# Patient Record
Sex: FEMALE | Race: WHITE | NOT HISPANIC OR LATINO | Employment: FULL TIME | URBAN - METROPOLITAN AREA
[De-identification: names, ages, dates, MRNs, and addresses within clinical notes are randomized per-mention and may not be internally consistent; named-entity substitution may affect disease eponyms.]

---

## 2020-04-07 ENCOUNTER — TELEPHONE (OUTPATIENT)
Dept: OBSTETRICS AND GYNECOLOGY | Facility: CLINIC | Age: 30
End: 2020-04-07

## 2020-04-07 DIAGNOSIS — N39.0 URINARY TRACT INFECTION WITHOUT HEMATURIA, SITE UNSPECIFIED: Primary | ICD-10-CM

## 2020-04-07 RX ORDER — NITROFURANTOIN 25; 75 MG/1; MG/1
100 CAPSULE ORAL 2 TIMES DAILY
Qty: 14 CAPSULE | Refills: 0 | Status: SHIPPED | OUTPATIENT
Start: 2020-04-07 | End: 2020-04-14

## 2020-04-07 NOTE — TELEPHONE ENCOUNTER
----- Message from Viral Barahona sent at 4/7/2020  2:02 PM CDT -----  Contact: pt  Type:  Patient Returning Call    Who Called:Kisha  Who Left Message for Patient:na  Does the patient know what this is regarding?:no  Would the patient rather a call back or a response via [facility]? Call back  Best Call Back Number:[phone] (home)   Additional Information: ilan

## 2020-04-07 NOTE — TELEPHONE ENCOUNTER
----- Message from Kristi Brandt sent at 4/7/2020  9:45 AM CDT -----  Contact: PT   Type:  Needs Medical Advice    Who Called: pt   Symptoms (please be specific): burning when urine    How long has patient had these symptoms:  1day   Pharmacy name and phone #:  [facility]   Would the patient rather a call back or a response via [facility]? Callback   Best Call Back Number: [phone]   Additional Information:    [facility] almaraz

## 2020-04-07 NOTE — TELEPHONE ENCOUNTER
Advised pt. To go to [facility] ([facility] to have UA prior to getting med. At pharmacy.  Pt. Verbalized understanding.

## 2020-04-16 LAB
AMORPH URATE CRY URNS QL MICRO: ABNORMAL
BACTERIA #/AREA URNS HPF: ABNORMAL /[HPF]
BILIRUB UR QL STRIP: ABNORMAL
CLARITY UR: CLEAR
COLOR UR: ABNORMAL
EPITHELIAL CELLS: ABNORMAL
GLUCOSE (UA): NEGATIVE MG/DL
KETONES UR QL STRIP: NEGATIVE MG/DL
LEUKOCYTE ESTERASE UR QL STRIP: ABNORMAL
MUCOUS THREADS URNS QL MICRO: ABNORMAL
NITRITE UR QL STRIP: POSITIVE
OCCULT BLOOD: ABNORMAL
PH, URINE: 7 (ref 5–7.5)
PROT UR QL STRIP: 15 MG/DL
RBC/HPF: ABNORMAL
SP GR UR STRIP: 1.01 (ref 1–1.03)
URINE CULTURE, ROUTINE: NORMAL
UROBILINOGEN, URINE: 8 E.U./DL (ref 0–1)
WBC/HPF: ABNORMAL

## 2020-06-11 ENCOUNTER — OFFICE VISIT (OUTPATIENT)
Dept: OBSTETRICS AND GYNECOLOGY | Facility: CLINIC | Age: 30
End: 2020-06-11
Payer: COMMERCIAL

## 2020-06-11 VITALS
HEIGHT: 67 IN | DIASTOLIC BLOOD PRESSURE: 80 MMHG | WEIGHT: 118 LBS | SYSTOLIC BLOOD PRESSURE: 122 MMHG | HEART RATE: 112 BPM | BODY MASS INDEX: 18.52 KG/M2

## 2020-06-11 DIAGNOSIS — Z01.419 WELL WOMAN EXAM: Primary | ICD-10-CM

## 2020-06-11 PROCEDURE — 99395 PREV VISIT EST AGE 18-39: CPT | Mod: S$GLB,,, | Performed by: OBSTETRICS & GYNECOLOGY

## 2020-06-11 PROCEDURE — 99395 PR PREVENTIVE VISIT,EST,18-39: ICD-10-PCS | Mod: S$GLB,,, | Performed by: OBSTETRICS & GYNECOLOGY

## 2020-06-11 RX ORDER — VENLAFAXINE HYDROCHLORIDE 37.5 MG/1
CAPSULE, EXTENDED RELEASE ORAL
COMMUNITY
Start: 2020-06-09 | End: 2022-10-06

## 2020-06-11 NOTE — PROGRESS NOTES
Subjective:       Patient ID: Kisha Gillis is a 30 y.o. female.    Chief Complaint:  Well Woman (LAST PAP 11/15/18 NEGATIVE, lmp: 2wks, no ocp )      History of Present Illness  She is doing well.  No new health issues,  No abnormal bleeding, No GI or Gu concerns,  No dyspareunia  Her only concern is decrease sex desire  Long discussion .   HPI      GYN & OB History  Patient's last menstrual period was 2020.     Date of Last Pap: No result found    OB History    Para Term  AB Living   2 2 2         SAB TAB Ectopic Multiple Live Births                  # Outcome Date GA Lbr Fabien/2nd Weight Sex Delivery Anes PTL Lv   2 Term 17 37w0d  3.459 kg (7 lb 10 oz) M Vag-Spont      1 Term 10/15/15 38w0d  3.062 kg (6 lb 12 oz) F Vag-Spont          Review of Systems  Review of Systems   Constitutional: Negative for activity change.   Eyes: Negative for visual disturbance.   Respiratory: Negative for shortness of breath.    Cardiovascular: Negative for chest pain.   Gastrointestinal: Negative for abdominal pain.   Genitourinary: Negative for vaginal bleeding.        No abnormal vaginal bleeding   Musculoskeletal: Negative for back pain.   Integumentary:  Negative for rash and breast mass.   Neurological: Negative for numbness.   Psychiatric/Behavioral:        No mood disturbance or changes    Breast: Negative for mass            Objective:    Physical Exam:   Constitutional: She is oriented to person, place, and time. She appears well-developed. She is cooperative.    HENT:   Head: Normocephalic.     Neck: Trachea normal. Neck supple. No thyromegaly present.    Cardiovascular: Regular rhythm and normal heart sounds.     Pulmonary/Chest: Effort normal and breath sounds normal. Right breast exhibits no mass, no nipple discharge and no skin change. Left breast exhibits no mass, no nipple discharge and no skin change.   Bilateral fibrocystic changes  During the exam self breast exams discussed          Abdominal: Soft. Normal appearance. There is no hepatosplenomegaly. There is no tenderness. There is no rebound and no guarding.     Genitourinary: Vagina normal and uterus normal. Pelvic exam was performed with patient supine. There is no lesion on the right labia. There is no lesion on the left labia. Uterus is not enlarged and not tender. Cervix is normal. Right adnexum displays no mass and no tenderness. Left adnexum displays no mass and no tenderness. No rectocele, cystocele or unspecified prolapse of vaginal walls in the vagina. Labial bartholins normal.Cervix exhibits no discharge. Additional cervical findings: pap smear done             Lymphadenopathy:        Head (right side): No submental and no submandibular adenopathy present.        Head (left side): No submental and no submandibular adenopathy present.     She has no cervical adenopathy.        Right: No inguinal adenopathy present.        Left: No inguinal adenopathy present.    Neurological: She is alert and oriented to person, place, and time.    Skin: Skin is warm and intact.    Psychiatric: She has a normal mood and affect. Her speech is normal and behavior is normal. Thought content normal.          Assessment:        1. Well woman exam    2. Sexual desire disorder                 Plan:             Pap discussed will return for her annual   Long discussion regarding sex desire   Pt is aware we call all results. If she does not hear from our office regarding her result within a week of having a study or procedure performed she is to call the office so that we can research the result for her.  Birth control discussed  Chaperone was present

## 2021-06-15 ENCOUNTER — OFFICE VISIT (OUTPATIENT)
Dept: OBSTETRICS AND GYNECOLOGY | Facility: CLINIC | Age: 31
End: 2021-06-15
Payer: COMMERCIAL

## 2021-06-15 VITALS
BODY MASS INDEX: 18.17 KG/M2 | DIASTOLIC BLOOD PRESSURE: 88 MMHG | HEART RATE: 94 BPM | SYSTOLIC BLOOD PRESSURE: 126 MMHG | WEIGHT: 116 LBS

## 2021-06-15 DIAGNOSIS — Z01.419 WELL WOMAN EXAM WITH ROUTINE GYNECOLOGICAL EXAM: Primary | ICD-10-CM

## 2021-06-15 PROCEDURE — 3008F BODY MASS INDEX DOCD: CPT | Mod: CPTII,S$GLB,, | Performed by: OBSTETRICS & GYNECOLOGY

## 2021-06-15 PROCEDURE — 99395 PREV VISIT EST AGE 18-39: CPT | Mod: S$GLB,,, | Performed by: OBSTETRICS & GYNECOLOGY

## 2021-06-15 PROCEDURE — 99395 PR PREVENTIVE VISIT,EST,18-39: ICD-10-PCS | Mod: S$GLB,,, | Performed by: OBSTETRICS & GYNECOLOGY

## 2021-06-15 PROCEDURE — 3008F PR BODY MASS INDEX (BMI) DOCUMENTED: ICD-10-PCS | Mod: CPTII,S$GLB,, | Performed by: OBSTETRICS & GYNECOLOGY

## 2021-06-15 RX ORDER — FLIBANSERIN 100 MG/1
100 TABLET, FILM COATED ORAL DAILY
Qty: 30 TABLET | Refills: 12 | Status: SHIPPED | OUTPATIENT
Start: 2021-06-15 | End: 2023-10-09

## 2021-06-15 RX ORDER — ESCITALOPRAM OXALATE 10 MG/1
TABLET ORAL
COMMUNITY
Start: 2021-05-31

## 2021-06-16 LAB
ABS NRBC COUNT: 0 X 10 3/UL (ref 0–0.01)
ABSOLUTE BASOPHIL: 0.04 X 10 3/UL (ref 0–0.22)
ABSOLUTE EOSINOPHIL: 0.13 X 10 3/UL (ref 0.04–0.54)
ABSOLUTE IMMATURE GRAN: 0.01 X 10 3/UL (ref 0–0.04)
ABSOLUTE LYMPHOCYTE: 2.07 X 10 3/UL (ref 0.86–4.75)
ABSOLUTE MONOCYTE: 0.7 X 10 3/UL (ref 0.22–1.08)
ALBUMIN SERPL-MCNC: 4.8 G/DL (ref 3.5–5.2)
ALBUMIN/GLOB SERPL ELPH: 1.5 {RATIO} (ref 1–2.7)
ALP ISOS SERPL LEV INH-CCNC: 50 U/L (ref 35–105)
ALT (SGPT): 10 U/L (ref 0–33)
AMORPH URATE CRY URNS QL MICRO: NEGATIVE
ANION GAP SERPL CALC-SCNC: 9 MMOL/L (ref 8–17)
AST SERPL-CCNC: 17 U/L (ref 0–32)
BACTERIA #/AREA URNS HPF: ABNORMAL /[HPF]
BASOPHILS NFR BLD: 0.5 % (ref 0.2–1.2)
BILIRUB UR QL STRIP: NEGATIVE
BILIRUBIN, TOTAL: 0.43 MG/DL (ref 0–1.2)
BUN/CREAT SERPL: 10.7 (ref 6–20)
CALCIUM SERPL-MCNC: 9.7 MG/DL (ref 8.6–10.2)
CARBON DIOXIDE, CO2: 26 MMOL/L (ref 22–29)
CHLORIDE: 103 MMOL/L (ref 98–107)
CHOLEST SERPL-MSCNC: 170 MG/DL (ref 100–200)
CLARITY UR: CLEAR
COLOR UR: YELLOW
CREAT SERPL-MCNC: 0.72 MG/DL (ref 0.5–0.9)
EOSINOPHIL NFR BLD: 1.7 % (ref 0.7–7)
EPITHELIAL CELLS: ABNORMAL
GFR ESTIMATION: 95.11
GLOBULIN: 3.3 G/DL (ref 1.5–4.5)
GLUCOSE (UA): NEGATIVE MG/DL
GLUCOSE: 87 MG/DL (ref 74–106)
HCT VFR BLD AUTO: 41.1 % (ref 37–47)
HDLC SERPL-MCNC: 88 MG/DL
HGB BLD-MCNC: 13.1 G/DL (ref 12–16)
IMMATURE GRANULOCYTES: 0.1 % (ref 0–0.5)
KETONES UR QL STRIP: NEGATIVE MG/DL
LDL/HDL RATIO: 0.8 (ref 1–3)
LDLC SERPL CALC-MCNC: 73.4 MG/DL (ref 0–100)
LEUKOCYTE ESTERASE UR QL STRIP: ABNORMAL
LYMPHOCYTES NFR BLD: 27.4 % (ref 19.3–53.1)
MCH RBC QN AUTO: 29.2 PG (ref 27–32)
MCHC RBC AUTO-ENTMCNC: 31.9 G/DL (ref 32–36)
MCV RBC AUTO: 91.5 FL (ref 82–100)
MONOCYTES NFR BLD: 9.3 % (ref 4.7–12.5)
MUCOUS THREADS URNS QL MICRO: ABNORMAL
NEUTROPHILS # BLD AUTO: 4.6 X 10 3/UL (ref 2.15–7.56)
NEUTROPHILS NFR BLD: 61 %
NITRITE UR QL STRIP: NEGATIVE
NUCLEATED RED BLOOD CELLS: 0 /100 WBC (ref 0–0.2)
OCCULT BLOOD: NEGATIVE
PH, URINE: 7 (ref 5–7.5)
PLATELET # BLD AUTO: 268 X 10 3/UL (ref 135–400)
POTASSIUM: 4.3 MMOL/L (ref 3.5–5.1)
PROT SNV-MCNC: 8.1 G/DL (ref 6.4–8.3)
PROT UR QL STRIP: NEGATIVE MG/DL
RBC # BLD AUTO: 4.49 X 10 6/UL (ref 4.2–5.4)
RBC/HPF: NEGATIVE
RDW-SD: 45.8 FL (ref 37–54)
SODIUM: 138 MMOL/L (ref 136–145)
SP GR UR STRIP: 1.01 (ref 1–1.03)
T4, FREE: 1.17 NG/DL (ref 0.93–1.7)
TRIGL SERPL-MCNC: 43 MG/DL (ref 0–150)
TSH SERPL DL<=0.005 MIU/L-ACNC: 0.74 UIU/ML (ref 0.27–4.2)
UREA NITROGEN (BUN): 7.7 MG/DL (ref 6–20)
UROBILINOGEN, URINE: NORMAL E.U./DL (ref 0–1)
VITAMIN D (25OHD): 27.8 NG/ML
WBC # BLD: 7.55 X 10 3/UL (ref 4.3–10.8)
WBC/HPF: ABNORMAL

## 2021-07-02 ENCOUNTER — PATIENT MESSAGE (OUTPATIENT)
Dept: OBSTETRICS AND GYNECOLOGY | Facility: CLINIC | Age: 31
End: 2021-07-02

## 2021-07-02 DIAGNOSIS — N39.0 URINARY TRACT INFECTION WITHOUT HEMATURIA, SITE UNSPECIFIED: ICD-10-CM

## 2021-07-06 RX ORDER — METHENAMINE, SODIUM PHOSPHATE, MONOBASIC, MONOHYDRATE, PHENYL SALICYLATE, METHYLENE BLUE, AND HYOSCYAMINE SULFATE 118; 40.8; 36; 10; .12 MG/1; MG/1; MG/1; MG/1; MG/1
1 CAPSULE ORAL EVERY 6 HOURS PRN
Qty: 28 CAPSULE | Refills: 1 | Status: SHIPPED | OUTPATIENT
Start: 2021-07-06 | End: 2022-01-10 | Stop reason: SDUPTHER

## 2021-07-06 RX ORDER — NITROFURANTOIN 25; 75 MG/1; MG/1
100 CAPSULE ORAL 2 TIMES DAILY
Qty: 14 CAPSULE | Refills: 0 | Status: SHIPPED | OUTPATIENT
Start: 2021-07-06 | End: 2021-07-13

## 2021-07-29 ENCOUNTER — PATIENT MESSAGE (OUTPATIENT)
Dept: OBSTETRICS AND GYNECOLOGY | Facility: CLINIC | Age: 31
End: 2021-07-29

## 2021-07-29 DIAGNOSIS — R30.0 DYSURIA: Primary | ICD-10-CM

## 2021-07-31 LAB
AMORPH URATE CRY URNS QL MICRO: NEGATIVE
BACTERIA #/AREA URNS HPF: ABNORMAL /[HPF]
BILIRUB UR QL STRIP: NEGATIVE
CLARITY UR: CLEAR
COLOR UR: YELLOW
EPITHELIAL CELLS: ABNORMAL
GLUCOSE (UA): NEGATIVE MG/DL
KETONES UR QL STRIP: NEGATIVE MG/DL
LEUKOCYTE ESTERASE UR QL STRIP: ABNORMAL
MUCOUS THREADS URNS QL MICRO: NEGATIVE
NITRITE UR QL STRIP: NEGATIVE
OCCULT BLOOD: ABNORMAL
PH, URINE: 7 (ref 5–7.5)
PROT UR QL STRIP: NEGATIVE MG/DL
RBC/HPF: ABNORMAL
SP GR UR STRIP: 1 (ref 1–1.03)
URINE CULTURE, ROUTINE: NORMAL
UROBILINOGEN, URINE: NORMAL E.U./DL (ref 0–1)
WBC/HPF: ABNORMAL

## 2021-07-31 RX ORDER — NITROFURANTOIN 25; 75 MG/1; MG/1
100 CAPSULE ORAL 2 TIMES DAILY
Qty: 14 CAPSULE | Refills: 0 | Status: SHIPPED | OUTPATIENT
Start: 2021-07-31 | End: 2021-08-07

## 2022-01-10 ENCOUNTER — PATIENT MESSAGE (OUTPATIENT)
Dept: OBSTETRICS AND GYNECOLOGY | Facility: CLINIC | Age: 32
End: 2022-01-10
Payer: COMMERCIAL

## 2022-01-10 DIAGNOSIS — R30.0 DYSURIA: Primary | ICD-10-CM

## 2022-01-10 DIAGNOSIS — N39.0 URINARY TRACT INFECTION WITHOUT HEMATURIA, SITE UNSPECIFIED: ICD-10-CM

## 2022-01-10 RX ORDER — NITROFURANTOIN 25; 75 MG/1; MG/1
100 CAPSULE ORAL 2 TIMES DAILY
Qty: 14 CAPSULE | Refills: 0 | Status: SHIPPED | OUTPATIENT
Start: 2022-01-10 | End: 2022-01-17

## 2022-01-10 RX ORDER — METHENAMINE, SODIUM PHOSPHATE, MONOBASIC, MONOHYDRATE, PHENYL SALICYLATE, METHYLENE BLUE, AND HYOSCYAMINE SULFATE 118; 40.8; 36; 10; .12 MG/1; MG/1; MG/1; MG/1; MG/1
1 CAPSULE ORAL EVERY 6 HOURS PRN
Qty: 28 CAPSULE | Refills: 1 | Status: SHIPPED | OUTPATIENT
Start: 2022-01-10 | End: 2023-10-09

## 2022-01-10 NOTE — TELEPHONE ENCOUNTER
Good morning Kisha,     I will submit a medication request for these medications  to Dr Anna for approval  Please check with your Pharmacy after 5:00    Karina   ===View-only below this line===      ----- Message -----       From:Kisha Gillis       Sent:1/10/2022  8:54 AM CST         To:Andi Anna III, MD    Subject:UTI    Friday night I woke up with symptoms of a UTI so Saturday morning I started taking some extra antibiotics that my mom had from DR. Anna. Im still taking them but wanted to see if yall could send out some more so I can continue taking them. The Antibiotics are nitrofurantoin mono 100mg. I know Im not supposed to start an antibiotic like that but I didnt want to suffer all weekend. Dr. Anna gave my mom extra antibiotics and the prescription for the pain (I guess a strong azo) I was wondering if he can give me the same thing for the next time I get one. I get UTIs  so often, I guess its genetics. Thanks!

## 2022-02-07 DIAGNOSIS — R30.0 DYSURIA: Primary | ICD-10-CM

## 2022-02-07 RX ORDER — CEPHALEXIN 500 MG/1
500 CAPSULE ORAL 3 TIMES DAILY
Qty: 21 CAPSULE | Refills: 0 | Status: SHIPPED | OUTPATIENT
Start: 2022-02-07 | End: 2022-02-14

## 2022-02-07 NOTE — TELEPHONE ENCOUNTER
Kisha,    I am ordering a Urinalysis and Culture and Sensitivity at [facility]. Please go and give a urine sample as soon as you are able. I am also sending Dr Anna a medication request.  Please check with your pharmacy after 5:00.  We will notify you of your results as soon as they become available.    Karina   ===View-only below this line===      ----- Message -----       From:Kisha Gillis       Sent:2/6/2022 10:40 AM CST         To:Andi Anna III, MD    Subject:UTI    I feel like I have another UTI. Ive been taking AZO defense since Friday. Just let me know what I need to do. I dont understand why I keep getting them. Thanks!

## 2022-02-09 LAB
AMORPH URATE CRY URNS QL MICRO: NEGATIVE
BACTERIA #/AREA URNS HPF: ABNORMAL /[HPF]
BILIRUB UR QL STRIP: NEGATIVE
CLARITY UR: CLEAR
COLOR UR: YELLOW
EPITHELIAL CELLS: ABNORMAL
GLUCOSE (UA): NEGATIVE MG/DL
KETONES UR QL STRIP: NEGATIVE MG/DL
LEUKOCYTE ESTERASE UR QL STRIP: ABNORMAL
MUCOUS THREADS URNS QL MICRO: NEGATIVE
NITRITE UR QL STRIP: NEGATIVE
OCCULT BLOOD: NEGATIVE
PH, URINE: 6 (ref 5–7.5)
PROT UR QL STRIP: NEGATIVE MG/DL
RBC/HPF: NEGATIVE
SP GR UR STRIP: 1 (ref 1–1.03)
URINE CULTURE, ROUTINE: NORMAL
UROBILINOGEN, URINE: NORMAL E.U./DL (ref 0–1)
WBC/HPF: ABNORMAL
YEAST URNS QL MICRO: ABNORMAL

## 2022-03-14 ENCOUNTER — PATIENT MESSAGE (OUTPATIENT)
Dept: OBSTETRICS AND GYNECOLOGY | Facility: CLINIC | Age: 32
End: 2022-03-14
Payer: COMMERCIAL

## 2022-03-14 ENCOUNTER — TELEPHONE (OUTPATIENT)
Dept: OBSTETRICS AND GYNECOLOGY | Facility: CLINIC | Age: 32
End: 2022-03-14
Payer: COMMERCIAL

## 2022-03-14 DIAGNOSIS — N39.0 URINARY TRACT INFECTION WITHOUT HEMATURIA, SITE UNSPECIFIED: Primary | ICD-10-CM

## 2022-03-14 LAB
AMORPH URATE CRY URNS QL MICRO: NEGATIVE
BACTERIA #/AREA URNS HPF: ABNORMAL /[HPF]
BILIRUB UR QL STRIP: NEGATIVE
CLARITY UR: CLEAR
COLOR UR: YELLOW
EPITHELIAL CELLS: ABNORMAL
GLUCOSE (UA): NEGATIVE MG/DL
KETONES UR QL STRIP: NEGATIVE MG/DL
LEUKOCYTE ESTERASE UR QL STRIP: ABNORMAL
MUCOUS THREADS URNS QL MICRO: NEGATIVE
NITRITE UR QL STRIP: NEGATIVE
OCCULT BLOOD: NEGATIVE
PH, URINE: 6.5 (ref 5–7.5)
PROT UR QL STRIP: NEGATIVE MG/DL
RBC/HPF: NEGATIVE
SP GR UR STRIP: 1 (ref 1–1.03)
UROBILINOGEN, URINE: NORMAL E.U./DL (ref 0–1)
WBC/HPF: ABNORMAL

## 2022-03-14 NOTE — TELEPHONE ENCOUNTER
Spoke to patient, she c/o burning with urination and abdominal discomfort. She is waiting at the path lab to do a urinalysis and is requesting that we send orders.   Orders printed and faxed. Patient advised that we will call her with results once they are available to us and send out the appropriate medication. Patient verbalized understanding.

## 2022-03-14 NOTE — TELEPHONE ENCOUNTER
----- Message from Olga Sierra sent at 3/14/2022 10:38 AM CDT -----  Contact: Meli @ [facility]  Requesting a call back regarding orders. Please fax to [phone], or call back at [phone]

## 2022-03-15 DIAGNOSIS — N39.0 URINARY TRACT INFECTION WITHOUT HEMATURIA, SITE UNSPECIFIED: Primary | ICD-10-CM

## 2022-03-15 DIAGNOSIS — N39.0 FREQUENT UTI: Primary | ICD-10-CM

## 2022-03-15 RX ORDER — NITROFURANTOIN 25; 75 MG/1; MG/1
100 CAPSULE ORAL DAILY PRN
Qty: 28 CAPSULE | Refills: 1 | Status: SHIPPED | OUTPATIENT
Start: 2022-03-15 | End: 2022-04-12

## 2022-03-15 RX ORDER — NITROFURANTOIN 25; 75 MG/1; MG/1
100 CAPSULE ORAL 2 TIMES DAILY
Qty: 14 CAPSULE | Refills: 0 | Status: SHIPPED | OUTPATIENT
Start: 2022-03-15 | End: 2022-03-22

## 2022-10-06 ENCOUNTER — OFFICE VISIT (OUTPATIENT)
Dept: OBSTETRICS AND GYNECOLOGY | Facility: CLINIC | Age: 32
End: 2022-10-06
Payer: COMMERCIAL

## 2022-10-06 VITALS
WEIGHT: 119 LBS | BODY MASS INDEX: 18.64 KG/M2 | DIASTOLIC BLOOD PRESSURE: 93 MMHG | HEART RATE: 105 BPM | SYSTOLIC BLOOD PRESSURE: 132 MMHG

## 2022-10-06 DIAGNOSIS — N39.0 URINARY TRACT INFECTION WITHOUT HEMATURIA, SITE UNSPECIFIED: ICD-10-CM

## 2022-10-06 DIAGNOSIS — Z01.419 WELL WOMAN EXAM WITH ROUTINE GYNECOLOGICAL EXAM: Primary | ICD-10-CM

## 2022-10-06 DIAGNOSIS — F52.0 HYPOACTIVE SEXUAL DESIRE DISORDER: ICD-10-CM

## 2022-10-06 PROCEDURE — 3008F BODY MASS INDEX DOCD: CPT | Mod: CPTII,S$GLB,, | Performed by: OBSTETRICS & GYNECOLOGY

## 2022-10-06 PROCEDURE — 3080F PR MOST RECENT DIASTOLIC BLOOD PRESSURE >= 90 MM HG: ICD-10-PCS | Mod: CPTII,S$GLB,, | Performed by: OBSTETRICS & GYNECOLOGY

## 2022-10-06 PROCEDURE — 3080F DIAST BP >= 90 MM HG: CPT | Mod: CPTII,S$GLB,, | Performed by: OBSTETRICS & GYNECOLOGY

## 2022-10-06 PROCEDURE — 3075F PR MOST RECENT SYSTOLIC BLOOD PRESS GE 130-139MM HG: ICD-10-PCS | Mod: CPTII,S$GLB,, | Performed by: OBSTETRICS & GYNECOLOGY

## 2022-10-06 PROCEDURE — 1159F MED LIST DOCD IN RCRD: CPT | Mod: CPTII,S$GLB,, | Performed by: OBSTETRICS & GYNECOLOGY

## 2022-10-06 PROCEDURE — 1159F PR MEDICATION LIST DOCUMENTED IN MEDICAL RECORD: ICD-10-PCS | Mod: CPTII,S$GLB,, | Performed by: OBSTETRICS & GYNECOLOGY

## 2022-10-06 PROCEDURE — 3075F SYST BP GE 130 - 139MM HG: CPT | Mod: CPTII,S$GLB,, | Performed by: OBSTETRICS & GYNECOLOGY

## 2022-10-06 PROCEDURE — 99395 PREV VISIT EST AGE 18-39: CPT | Mod: S$GLB,,, | Performed by: OBSTETRICS & GYNECOLOGY

## 2022-10-06 PROCEDURE — 3008F PR BODY MASS INDEX (BMI) DOCUMENTED: ICD-10-PCS | Mod: CPTII,S$GLB,, | Performed by: OBSTETRICS & GYNECOLOGY

## 2022-10-06 PROCEDURE — 99395 PR PREVENTIVE VISIT,EST,18-39: ICD-10-PCS | Mod: S$GLB,,, | Performed by: OBSTETRICS & GYNECOLOGY

## 2022-10-06 RX ORDER — NITROFURANTOIN 25; 75 MG/1; MG/1
CAPSULE ORAL
Qty: 30 CAPSULE | Refills: 3 | Status: SHIPPED | OUTPATIENT
Start: 2022-10-06 | End: 2023-10-09

## 2022-10-06 RX ORDER — BUSPIRONE HYDROCHLORIDE 5 MG/1
TABLET ORAL
COMMUNITY
Start: 2022-08-12 | End: 2023-10-09

## 2022-10-06 RX ORDER — NITROFURANTOIN 25; 75 MG/1; MG/1
100 CAPSULE ORAL 2 TIMES DAILY
Qty: 14 CAPSULE | Refills: 0 | Status: SHIPPED | OUTPATIENT
Start: 2022-10-06 | End: 2022-10-13

## 2022-10-06 RX ORDER — DEXTROAMPHETAMINE SACCHARATE, AMPHETAMINE ASPARTATE, DEXTROAMPHETAMINE SULFATE AND AMPHETAMINE SULFATE 2.5; 2.5; 2.5; 2.5 MG/1; MG/1; MG/1; MG/1
1 TABLET ORAL 2 TIMES DAILY
COMMUNITY
Start: 2022-08-15

## 2022-10-06 NOTE — PROGRESS NOTES
Subjective:       Patient ID: Kisha Gillis is a 32 y.o. female.    Chief Complaint:  Well Woman (PATIENT C/O RECURRENT UTI. SHE WOULD LIKE TO LEAVE URINE SAMPLE TODAY TO SEND TO THE LAB. SHE STATES SHE WAS GIVEN ANTIBIOTICS TO TAKE AFTER INTERCOURSE AS SHE FEELS THIS IS MOSTLY WHEN SHE GETS THEM, BUT IS WORRIED ABOUT TAKING THEM TOO OFTEN. )      History of Present Illness  She is doing well.  No new health issues,  No abnormal bleeding, No GI or Gu concerns,  No dyspareunia  . She finds that she was taking the a macrobid      she   HPI      GYN & OB History  Patient's last menstrual period was 2022.     Date of Last Pap: No result found    OB History    Para Term  AB Living   2 2 2         SAB IAB Ectopic Multiple Live Births                  # Outcome Date GA Lbr Fabien/2nd Weight Sex Delivery Anes PTL Lv   2 Term 17 37w0d  3.459 kg (7 lb 10 oz) M Vag-Spont      1 Term 10/15/15 38w0d  3.062 kg (6 lb 12 oz) F Vag-Spont          Review of Systems  Review of Systems   Constitutional:  Negative for activity change.   Eyes:  Negative for visual disturbance.   Respiratory:  Negative for shortness of breath.    Cardiovascular:  Negative for chest pain.   Gastrointestinal:  Negative for abdominal pain.   Genitourinary:  Negative for vaginal bleeding.        No abnormal vaginal bleeding   Musculoskeletal:  Negative for back pain.   Integumentary:  Negative for rash and breast mass.   Neurological:  Negative for numbness.   Psychiatric/Behavioral:          No mood disturbance or changes    Breast: Negative for mass          Objective:    Physical Exam:   Constitutional: She is oriented to person, place, and time. She appears well-developed. She is cooperative.    HENT:   Head: Normocephalic.     Neck: Trachea normal. No thyromegaly present.    Cardiovascular:  Normal rate, regular rhythm and normal heart sounds.             Pulmonary/Chest: Effort normal and breath sounds normal. Right breast  exhibits no mass, no nipple discharge and no skin change. Left breast exhibits no mass, no nipple discharge and no skin change.        Abdominal: Soft. There is no abdominal tenderness. There is no rebound and no guarding.     Genitourinary:    Vagina and uterus normal.      Pelvic exam was performed with patient supine.   Labial bartholins normal.There is no lesion on the right labia. There is no lesion on the left labia. Cervix is normal. Right adnexum displays no mass and no tenderness. Left adnexum displays no mass and no tenderness. Cervix exhibits no discharge. Uterus is not enlarged and not tender.              Lymphadenopathy:        Head (right side): No submental and no submandibular adenopathy present.        Head (left side): No submental and no submandibular adenopathy present.     She has no cervical adenopathy.    Neurological: She is alert and oriented to person, place, and time.    Skin: Skin is warm.    Psychiatric: She has a normal mood and affect. Her speech is normal and behavior is normal. Thought content normal.        Assessment:        1. Well woman exam with routine gynecological exam    2. Urinary tract infection without hematuria, site unspecified    3. Hypoactive sexual desire disorder                 Plan:             Pap discussed will return for her annual     Pt is aware we call all results. If she does not hear from our office regarding her result within a week of having a study or procedure performed she is to call the office so that we can research the result for her.  Birth control discussed  Chaperone was present

## 2022-10-07 LAB — Lab: NORMAL

## 2022-10-07 NOTE — PROGRESS NOTES
Dr Wilfrido Beard sent out an antibiotic to treat a urinary tract infection.  Please check with your Pharmacy.

## 2022-10-08 LAB
AMORPH URATE CRY URNS QL MICRO: ABNORMAL
BACTERIA #/AREA URNS HPF: ABNORMAL /[HPF]
BILIRUB UR QL STRIP: NEGATIVE
CLARITY UR: CLEAR
COLOR UR: YELLOW
EPITHELIAL CELLS: ABNORMAL
GLUCOSE (UA): NEGATIVE MG/DL
KETONES UR QL STRIP: NEGATIVE MG/DL
LEUKOCYTE ESTERASE UR QL STRIP: ABNORMAL
MUCOUS THREADS URNS QL MICRO: NEGATIVE
NITRITE UR QL STRIP: NEGATIVE
OCCULT BLOOD: NEGATIVE
PH, URINE: 6 (ref 5–7.5)
PROT UR QL STRIP: NEGATIVE MG/DL
RBC/HPF: NEGATIVE
SP GR UR STRIP: 1 (ref 1–1.03)
URINE CULTURE, ROUTINE: NORMAL
UROBILINOGEN, URINE: NORMAL E.U./DL (ref 0–1)
WBC/HPF: ABNORMAL

## 2022-10-10 ENCOUNTER — PATIENT MESSAGE (OUTPATIENT)
Dept: OBSTETRICS AND GYNECOLOGY | Facility: CLINIC | Age: 32
End: 2022-10-10
Payer: COMMERCIAL

## 2022-10-19 ENCOUNTER — PATIENT MESSAGE (OUTPATIENT)
Dept: OBSTETRICS AND GYNECOLOGY | Facility: CLINIC | Age: 32
End: 2022-10-19
Payer: COMMERCIAL

## 2022-10-19 DIAGNOSIS — N39.0 URINARY TRACT INFECTION WITHOUT HEMATURIA, SITE UNSPECIFIED: Primary | ICD-10-CM

## 2022-10-19 RX ORDER — CIPROFLOXACIN 500 MG/1
500 TABLET ORAL 2 TIMES DAILY
Qty: 14 TABLET | Refills: 0 | Status: SHIPPED | OUTPATIENT
Start: 2022-10-19 | End: 2022-10-26

## 2023-10-09 ENCOUNTER — OFFICE VISIT (OUTPATIENT)
Dept: OBSTETRICS AND GYNECOLOGY | Facility: CLINIC | Age: 33
End: 2023-10-09
Payer: COMMERCIAL

## 2023-10-09 VITALS
BODY MASS INDEX: 20.05 KG/M2 | HEART RATE: 92 BPM | WEIGHT: 128 LBS | DIASTOLIC BLOOD PRESSURE: 89 MMHG | SYSTOLIC BLOOD PRESSURE: 147 MMHG

## 2023-10-09 DIAGNOSIS — Z01.419 WELL WOMAN EXAM WITH ROUTINE GYNECOLOGICAL EXAM: ICD-10-CM

## 2023-10-09 DIAGNOSIS — N94.6 DYSMENORRHEA: Primary | ICD-10-CM

## 2023-10-09 DIAGNOSIS — N92.0 MENORRHAGIA WITH REGULAR CYCLE: ICD-10-CM

## 2023-10-09 PROCEDURE — 99395 PREV VISIT EST AGE 18-39: CPT | Mod: S$GLB,,, | Performed by: OBSTETRICS & GYNECOLOGY

## 2023-10-09 PROCEDURE — 1159F PR MEDICATION LIST DOCUMENTED IN MEDICAL RECORD: ICD-10-PCS | Mod: CPTII,S$GLB,, | Performed by: OBSTETRICS & GYNECOLOGY

## 2023-10-09 PROCEDURE — 3008F BODY MASS INDEX DOCD: CPT | Mod: CPTII,S$GLB,, | Performed by: OBSTETRICS & GYNECOLOGY

## 2023-10-09 PROCEDURE — 3079F DIAST BP 80-89 MM HG: CPT | Mod: CPTII,S$GLB,, | Performed by: OBSTETRICS & GYNECOLOGY

## 2023-10-09 PROCEDURE — 3077F SYST BP >= 140 MM HG: CPT | Mod: CPTII,S$GLB,, | Performed by: OBSTETRICS & GYNECOLOGY

## 2023-10-09 PROCEDURE — 99395 PR PREVENTIVE VISIT,EST,18-39: ICD-10-PCS | Mod: S$GLB,,, | Performed by: OBSTETRICS & GYNECOLOGY

## 2023-10-09 PROCEDURE — 3079F PR MOST RECENT DIASTOLIC BLOOD PRESSURE 80-89 MM HG: ICD-10-PCS | Mod: CPTII,S$GLB,, | Performed by: OBSTETRICS & GYNECOLOGY

## 2023-10-09 PROCEDURE — 3077F PR MOST RECENT SYSTOLIC BLOOD PRESSURE >= 140 MM HG: ICD-10-PCS | Mod: CPTII,S$GLB,, | Performed by: OBSTETRICS & GYNECOLOGY

## 2023-10-09 PROCEDURE — 3008F PR BODY MASS INDEX (BMI) DOCUMENTED: ICD-10-PCS | Mod: CPTII,S$GLB,, | Performed by: OBSTETRICS & GYNECOLOGY

## 2023-10-09 PROCEDURE — 1159F MED LIST DOCD IN RCRD: CPT | Mod: CPTII,S$GLB,, | Performed by: OBSTETRICS & GYNECOLOGY

## 2023-10-09 NOTE — PROGRESS NOTES
Subjective:       Patient ID: Kisha Gillis is a 33 y.o. female.    Chief Complaint:  Well Woman      History of Present Illness  She is doing well.  No new health issues,  No abnormal bleeding, No GI or Gu concerns,  No dyspareunia  Reg cycles very heavy  vasectomy   sig cramps   the first two but she will cramp for two weeks before and she has moodiness.  She is on lexapro and buspar she does take adderal with her work.     .   HPI      GYN & OB History  Patient's last menstrual period was 2023.     Date of Last Pap: No result found    OB History    Para Term  AB Living   2 2 2         SAB IAB Ectopic Multiple Live Births                  # Outcome Date GA Lbr Fabien/2nd Weight Sex Delivery Anes PTL Lv   2 Term 17 37w0d  3.459 kg (7 lb 10 oz) M Vag-Spont      1 Term 10/15/15 38w0d  3.062 kg (6 lb 12 oz) F Vag-Spont          Review of Systems  Review of Systems   Constitutional:  Negative for activity change.   Eyes:  Negative for visual disturbance.   Respiratory:  Negative for shortness of breath.    Cardiovascular:  Negative for chest pain.   Gastrointestinal:  Negative for abdominal pain.   Genitourinary:  Positive for dysmenorrhea and menorrhagia. Negative for vaginal bleeding.        No abnormal vaginal bleeding   Musculoskeletal:  Negative for back pain.   Integumentary:  Negative for rash and breast mass.   Neurological:  Negative for numbness.   Psychiatric/Behavioral:          No mood disturbance or changes    Breast: Negative for mass            Objective:    Physical Exam:   Constitutional: She is oriented to person, place, and time. She appears well-developed. She is cooperative.    HENT:   Head: Normocephalic.     Neck: Trachea normal. No thyromegaly present.    Cardiovascular:  Normal rate, regular rhythm and normal heart sounds.             Pulmonary/Chest: Effort normal and breath sounds normal. Right breast exhibits no mass, no nipple discharge and no skin change. Left  breast exhibits no mass, no nipple discharge and no skin change.        Abdominal: Soft. There is no abdominal tenderness. There is no rebound and no guarding.     Genitourinary:    Vagina and uterus normal.      Pelvic exam was performed with patient supine.   Labial bartholins normal.There is no lesion on the right labia. There is no lesion on the left labia. Cervix is normal. Right adnexum displays no mass and no tenderness. Left adnexum displays no mass and no tenderness. Cervix exhibits no discharge. Uterus is not enlarged and not tender.              Lymphadenopathy:        Head (right side): No submental and no submandibular adenopathy present.        Head (left side): No submental and no submandibular adenopathy present.     She has no cervical adenopathy.    Neurological: She is alert and oriented to person, place, and time.    Skin: Skin is warm.    Psychiatric: She has a normal mood and affect. Her speech is normal and behavior is normal. Thought content normal.          Assessment:        1. Dysmenorrhea    2. Menorrhagia with regular cycle    3. Well woman exam with routine gynecological exam                 Plan:             Pap not done   discussed with her that she would do great with a [facility]   she will consider     labs offered  discussed will return for her annual     Pt is aware we call all results. If she does not hear from our office regarding her result within a week of having a study or procedure performed she is to call the office so that we can research the result for her.  Birth control discussed  Chaperone was present

## 2025-08-07 ENCOUNTER — OFFICE VISIT (OUTPATIENT)
Dept: OBSTETRICS AND GYNECOLOGY | Facility: CLINIC | Age: 35
End: 2025-08-07
Payer: COMMERCIAL

## 2025-08-07 VITALS
DIASTOLIC BLOOD PRESSURE: 82 MMHG | HEART RATE: 93 BPM | WEIGHT: 125.19 LBS | SYSTOLIC BLOOD PRESSURE: 118 MMHG | BODY MASS INDEX: 19.61 KG/M2

## 2025-08-07 DIAGNOSIS — N94.6 DYSMENORRHEA: ICD-10-CM

## 2025-08-07 DIAGNOSIS — Z01.419 WELL WOMAN EXAM WITH ROUTINE GYNECOLOGICAL EXAM: Primary | ICD-10-CM

## 2025-08-07 PROCEDURE — 3079F DIAST BP 80-89 MM HG: CPT | Mod: CPTII,,,

## 2025-08-07 PROCEDURE — 1159F MED LIST DOCD IN RCRD: CPT | Mod: CPTII,,,

## 2025-08-07 PROCEDURE — 3008F BODY MASS INDEX DOCD: CPT | Mod: CPTII,,,

## 2025-08-07 PROCEDURE — 3074F SYST BP LT 130 MM HG: CPT | Mod: CPTII,,,

## 2025-08-07 PROCEDURE — 99395 PREV VISIT EST AGE 18-39: CPT | Mod: S$PBB,,,

## 2025-08-07 RX ORDER — NAPROXEN 500 MG/1
500 TABLET ORAL EVERY 12 HOURS PRN
Qty: 30 TABLET | Refills: 3 | Status: SHIPPED | OUTPATIENT
Start: 2025-08-07

## 2025-08-07 RX ORDER — CLONAZEPAM 0.5 MG/1
0.5 TABLET ORAL 2 TIMES DAILY PRN
COMMUNITY

## 2025-08-07 NOTE — PROGRESS NOTES
Subjective:      Patient ID: Kisha Gillis is a 35 y.o. female who presents for evaluation today.    Chief Complaint:    Well Woman      History of Present Illness  HPI  Annual Exam (Premenopausal) - Patient presents for annual exam. The patient also has complaints of crampy periods. The patient is sexually active. GYN screening history: last pap: was normal. The patient wears seatbelts: yes. The patient participates in regular exercise: yes. Has the patient ever been transfused or tattooed?: not asked. The patient reports that there is not domestic violence in her life. She reports regular periods. Her  has a vasectomy. They are very crampy and mood swings are bad the week before. Flow is heavy the first few days. She denies GI or  problems. She is on lexparo 10mg, may consider increasing as well. She will be seeing her PCP soon to discuss. We discussed OCPs, IUD. Does not desire surgery at this time.     GYN History  No LMP recorded (within days).   Date of Last Pap: Pap smear completed today with HPV co-testing    VITALS  /82   Pulse 93   Wt 56.8 kg (125 lb 3.2 oz)   LMP  (Within Days)   BMI 19.61 kg/m²   Weight: 56.8 kg (125 lb 3.2 oz)         PAST MEDICAL HISTORY  Past Medical History:   Diagnosis Date    Anxiety     LGSIL of cervix of undetermined significance     Urgency of micturition        PAST SURGICAL HISTORY  Past Surgical History:   Procedure Laterality Date    WISDOM TOOTH EXTRACTION         SOCIAL HISTORY  Tobacco Use History[1],   Social History     Substance and Sexual Activity   Alcohol Use Yes    Comment: occasional        MEDICATIONS  Outpatient Medications Marked as Taking for the 8/7/25 encounter (Office Visit) with Kisha Gaming NP   Medication Sig Dispense Refill    clonazePAM (KLONOPIN) 0.5 MG tablet Take 0.5 mg by mouth 2 (two) times daily as needed for Anxiety.      dextroamphetamine-amphetamine 10 mg Tab Take 1 tablet by mouth 2 (two) times daily.       EScitalopram oxalate (LEXAPRO) 10 MG tablet            Review of Systems   Review of Systems   Constitutional:  Negative for activity change.   Eyes:  Negative for visual disturbance.   Respiratory:  Negative for shortness of breath.    Cardiovascular:  Negative for chest pain.   Gastrointestinal:  Negative for abdominal pain.   Genitourinary:  Positive for dysmenorrhea. Negative for vaginal bleeding.        No abnormal vaginal bleeding   Musculoskeletal:  Negative for back pain.   Integumentary:  Negative for rash and breast mass.   Neurological:  Negative for numbness.   Psychiatric/Behavioral:          No mood disturbance or changes    Breast: Negative for mass          Objective:     Physical Exam:   Constitutional: She is oriented to person, place, and time. She appears well-developed. She is cooperative.    HENT:   Head: Normocephalic.     Neck: Trachea normal. No thyromegaly present.    Cardiovascular:  Normal rate, regular rhythm and normal heart sounds.             Pulmonary/Chest: Effort normal and breath sounds normal. Right breast exhibits no mass, no nipple discharge and no skin change. Left breast exhibits no mass, no nipple discharge and no skin change.        Abdominal: Soft. There is no abdominal tenderness. There is no rebound and no guarding.     Genitourinary:    Vagina and uterus normal.      Pelvic exam was performed with patient in the lithotomy position.   The external female genitalia was normal.     Labial bartholins normal.There is no lesion on the right labia. There is no lesion on the left labia. Cervix is normal. Right adnexum displays no mass and no tenderness. Left adnexum displays no mass and no tenderness. Cervix exhibits no discharge.    pap smear completedUterus is not enlarged and not tender.              Lymphadenopathy:        Head (right side): No submental and no submandibular adenopathy present.        Head (left side): No submental and no submandibular adenopathy present.      She has no cervical adenopathy.    Neurological: She is alert and oriented to person, place, and time.    Skin: Skin is warm.    Psychiatric: She has a normal mood and affect. Her speech is normal and behavior is normal. Thought content normal.          Assessment:     Well woman exam with routine gynecological exam  -     Liquid-based pap smear, screening    Dysmenorrhea  -     naproxen (NAPROSYN) 500 MG tablet; Take 1 tablet (500 mg total) by mouth every 12 (twelve) hours as needed (dysmenorrhea).  Dispense: 30 tablet; Refill: 3         Plan:     Patient instructed to contact the clinic should any questions or concerns arise prior to her next office visit. Patient is happy with the plan of care at this time, verbalizes understanding and denies outstanding questions.      Pap  Self-breast exams  Birth Control discussed, OCPs vs IUDs, she will reach out if desires  If you don't hear from the office regarding results within 1 week, please call  Follow up in 1 year for annual or sooner as needed  Chaperone present for exam   Female chaperone present.          [1]   Social History  Tobacco Use   Smoking Status Never   Smokeless Tobacco Never